# Patient Record
Sex: FEMALE | Race: WHITE | Employment: FULL TIME | URBAN - METROPOLITAN AREA
[De-identification: names, ages, dates, MRNs, and addresses within clinical notes are randomized per-mention and may not be internally consistent; named-entity substitution may affect disease eponyms.]

---

## 2018-01-24 ENCOUNTER — APPOINTMENT (OUTPATIENT)
Dept: CT IMAGING | Facility: HOSPITAL | Age: 48
DRG: 175 | End: 2018-01-24
Attending: INTERNAL MEDICINE
Payer: COMMERCIAL

## 2018-01-24 ENCOUNTER — HOSPITAL ENCOUNTER (INPATIENT)
Facility: HOSPITAL | Age: 48
LOS: 6 days | Discharge: HOME | DRG: 175 | End: 2018-01-30
Attending: INTERNAL MEDICINE | Admitting: INTERNAL MEDICINE
Payer: COMMERCIAL

## 2018-01-24 ENCOUNTER — HOSPITAL ENCOUNTER (EMERGENCY)
Facility: HOSPITAL | Age: 48
Discharge: HOME | DRG: 175 | End: 2018-01-24
Payer: COMMERCIAL

## 2018-01-24 VITALS
OXYGEN SATURATION: 85 % | BODY MASS INDEX: 39.27 KG/M2 | WEIGHT: 200 LBS | HEART RATE: 86 BPM | HEIGHT: 60 IN | RESPIRATION RATE: 16 BRPM | TEMPERATURE: 99 F | DIASTOLIC BLOOD PRESSURE: 66 MMHG | SYSTOLIC BLOOD PRESSURE: 93 MMHG

## 2018-01-24 LAB
ALBUMIN SERPL-MCNC: 2.6 G/DL (ref 3.5–4.8)
ALP LIVER SERPL-CCNC: 105 U/L (ref 39–100)
ALT SERPL-CCNC: 82 U/L (ref 14–54)
AST SERPL-CCNC: 47 U/L (ref 15–41)
BASOPHILS # BLD AUTO: 0.02 X10(3) UL (ref 0–0.1)
BASOPHILS NFR BLD AUTO: 0.4 %
BILIRUB SERPL-MCNC: 0.4 MG/DL (ref 0.1–2)
BUN BLD-MCNC: 8 MG/DL (ref 8–20)
CALCIUM BLD-MCNC: 8.1 MG/DL (ref 8.3–10.3)
CHLORIDE: 105 MMOL/L (ref 101–111)
CO2: 24 MMOL/L (ref 22–32)
CREAT BLD-MCNC: 0.59 MG/DL (ref 0.55–1.02)
D-DIMER: 2.91 UG/ML FEU (ref 0–0.49)
EOSINOPHIL # BLD AUTO: 0.04 X10(3) UL (ref 0–0.3)
EOSINOPHIL NFR BLD AUTO: 0.9 %
ERYTHROCYTE [DISTWIDTH] IN BLOOD BY AUTOMATED COUNT: 13.2 % (ref 11.5–16)
GLUCOSE BLD-MCNC: 85 MG/DL (ref 70–99)
HAV IGM SER QL: 2.1 MG/DL (ref 1.7–3)
HCT VFR BLD AUTO: 37.6 % (ref 34–50)
HGB BLD-MCNC: 12.5 G/DL (ref 12–16)
IMMATURE GRANULOCYTE COUNT: 0.09 X10(3) UL (ref 0–1)
IMMATURE GRANULOCYTE RATIO %: 2 %
LYMPHOCYTES # BLD AUTO: 1.12 X10(3) UL (ref 0.9–4)
LYMPHOCYTES NFR BLD AUTO: 24.3 %
M PROTEIN MFR SERPL ELPH: 7.4 G/DL (ref 6.1–8.3)
MCH RBC QN AUTO: 28.2 PG (ref 27–33.2)
MCHC RBC AUTO-ENTMCNC: 33.2 G/DL (ref 31–37)
MCV RBC AUTO: 84.9 FL (ref 81–100)
MONOCYTES # BLD AUTO: 0.59 X10(3) UL (ref 0.1–0.6)
MONOCYTES NFR BLD AUTO: 12.8 %
NEUTROPHIL ABS PRELIM: 2.75 X10 (3) UL (ref 1.3–6.7)
NEUTROPHILS # BLD AUTO: 2.75 X10(3) UL (ref 1.3–6.7)
NEUTROPHILS NFR BLD AUTO: 59.6 %
PLATELET # BLD AUTO: 308 10(3)UL (ref 150–450)
POTASSIUM SERPL-SCNC: 3.8 MMOL/L (ref 3.6–5.1)
PROCALCITONIN SERPL-MCNC: <0.11 NG/ML (ref ?–0.11)
RBC # BLD AUTO: 4.43 X10(6)UL (ref 3.8–5.1)
RED CELL DISTRIBUTION WIDTH-SD: 41.2 FL (ref 35.1–46.3)
SODIUM SERPL-SCNC: 139 MMOL/L (ref 136–144)
WBC # BLD AUTO: 4.6 X10(3) UL (ref 4–13)

## 2018-01-24 PROCEDURE — 87081 CULTURE SCREEN ONLY: CPT | Performed by: INTERNAL MEDICINE

## 2018-01-24 PROCEDURE — 83735 ASSAY OF MAGNESIUM: CPT | Performed by: INTERNAL MEDICINE

## 2018-01-24 PROCEDURE — 80053 COMPREHEN METABOLIC PANEL: CPT | Performed by: INTERNAL MEDICINE

## 2018-01-24 PROCEDURE — 85378 FIBRIN DEGRADE SEMIQUANT: CPT | Performed by: INTERNAL MEDICINE

## 2018-01-24 PROCEDURE — 71275 CT ANGIOGRAPHY CHEST: CPT | Performed by: INTERNAL MEDICINE

## 2018-01-24 PROCEDURE — 85025 COMPLETE CBC W/AUTO DIFF WBC: CPT | Performed by: INTERNAL MEDICINE

## 2018-01-24 PROCEDURE — 84145 PROCALCITONIN (PCT): CPT | Performed by: INTERNAL MEDICINE

## 2018-01-24 RX ORDER — GUAIFENESIN AND DEXTROMETHORPHAN HYDROBROMIDE 1200; 60 MG/1; MG/1
1 TABLET, EXTENDED RELEASE ORAL EVERY 12 HOURS
Status: DISCONTINUED | OUTPATIENT
Start: 2018-01-24 | End: 2018-01-30

## 2018-01-24 RX ORDER — GUAIFENESIN AND DEXTROMETHORPHAN HYDROBROMIDE 1200; 60 MG/1; MG/1
1 TABLET, EXTENDED RELEASE ORAL EVERY 12 HOURS
Status: DISCONTINUED | OUTPATIENT
Start: 2018-01-24 | End: 2018-01-24 | Stop reason: RX

## 2018-01-24 RX ORDER — LEVOFLOXACIN 750 MG/1
750 TABLET ORAL DAILY
COMMUNITY
End: 2018-01-30

## 2018-01-24 RX ORDER — OSELTAMIVIR PHOSPHATE 75 MG/1
75 CAPSULE ORAL 2 TIMES DAILY
Status: COMPLETED | OUTPATIENT
Start: 2018-01-24 | End: 2018-01-26

## 2018-01-24 RX ORDER — SODIUM CHLORIDE 9 MG/ML
INJECTION, SOLUTION INTRAVENOUS CONTINUOUS
Status: DISCONTINUED | OUTPATIENT
Start: 2018-01-24 | End: 2018-01-28

## 2018-01-24 RX ORDER — GUAIFENESIN 600 MG
1200 TABLET, EXTENDED RELEASE 12 HR ORAL EVERY 12 HOURS
Status: DISCONTINUED | OUTPATIENT
Start: 2018-01-24 | End: 2018-01-24

## 2018-01-24 RX ORDER — SACCHAROMYCES BOULARDII 250 MG
250 CAPSULE ORAL 2 TIMES DAILY
COMMUNITY
End: 2018-01-30

## 2018-01-24 RX ORDER — ENOXAPARIN SODIUM 100 MG/ML
40 INJECTION SUBCUTANEOUS DAILY
Status: DISCONTINUED | OUTPATIENT
Start: 2018-01-24 | End: 2018-01-24

## 2018-01-24 RX ORDER — OSELTAMIVIR PHOSPHATE 30 MG/1
30 CAPSULE ORAL 2 TIMES DAILY
COMMUNITY
End: 2018-01-30

## 2018-01-24 RX ORDER — LEVOFLOXACIN 500 MG/1
750 TABLET, FILM COATED ORAL DAILY
Status: ON HOLD | COMMUNITY
End: 2018-01-24

## 2018-01-24 RX ORDER — GUAIFENESIN AND DEXTROMETHORPHAN HYDROBROMIDE 1200; 60 MG/1; MG/1
1 TABLET, EXTENDED RELEASE ORAL EVERY 12 HOURS
COMMUNITY

## 2018-01-25 ENCOUNTER — APPOINTMENT (OUTPATIENT)
Dept: ULTRASOUND IMAGING | Facility: HOSPITAL | Age: 48
DRG: 175 | End: 2018-01-25
Attending: INTERNAL MEDICINE
Payer: COMMERCIAL

## 2018-01-25 ENCOUNTER — APPOINTMENT (OUTPATIENT)
Dept: CV DIAGNOSTICS | Facility: HOSPITAL | Age: 48
DRG: 175 | End: 2018-01-25
Attending: INTERNAL MEDICINE
Payer: COMMERCIAL

## 2018-01-25 LAB
ADENOVIRUS PCR:: NEGATIVE
ALBUMIN SERPL-MCNC: 2.3 G/DL (ref 3.5–4.8)
ALP LIVER SERPL-CCNC: 101 U/L (ref 39–100)
ALT SERPL-CCNC: 61 U/L (ref 14–54)
APTT PPP: 126 SECONDS (ref 25–34)
APTT PPP: 31.4 SECONDS (ref 25–34)
AST SERPL-CCNC: 33 U/L (ref 15–41)
B PERT DNA SPEC QL NAA+PROBE: NEGATIVE
BILIRUB SERPL-MCNC: 0.4 MG/DL (ref 0.1–2)
BILIRUB UR QL STRIP.AUTO: NEGATIVE
BUN BLD-MCNC: 8 MG/DL (ref 8–20)
BUN BLD-MCNC: 8 MG/DL (ref 8–20)
C PNEUM DNA SPEC QL NAA+PROBE: NEGATIVE
CALCIUM BLD-MCNC: 7.7 MG/DL (ref 8.3–10.3)
CALCIUM BLD-MCNC: 7.8 MG/DL (ref 8.3–10.3)
CHLORIDE: 107 MMOL/L (ref 101–111)
CHLORIDE: 107 MMOL/L (ref 101–111)
CLARITY UR REFRACT.AUTO: CLEAR
CO2: 25 MMOL/L (ref 22–32)
CO2: 25 MMOL/L (ref 22–32)
CORONAVIRUS 229E PCR:: NEGATIVE
CORONAVIRUS HKU1 PCR:: NEGATIVE
CORONAVIRUS NL63 PCR:: NEGATIVE
CORONAVIRUS OC43 PCR:: NEGATIVE
CREAT BLD-MCNC: 0.48 MG/DL (ref 0.55–1.02)
CREAT BLD-MCNC: 0.52 MG/DL (ref 0.55–1.02)
ERYTHROCYTE [DISTWIDTH] IN BLOOD BY AUTOMATED COUNT: 13.3 % (ref 11.5–16)
FLUAV H1 2009 PAND RNA SPEC QL NAA+PROBE: POSITIVE
FLUBV RNA SPEC QL NAA+PROBE: NEGATIVE
GLUCOSE BLD-MCNC: 90 MG/DL (ref 70–99)
GLUCOSE BLD-MCNC: 93 MG/DL (ref 70–99)
GLUCOSE UR STRIP.AUTO-MCNC: NEGATIVE MG/DL
HCT VFR BLD AUTO: 39.2 % (ref 34–50)
HGB BLD-MCNC: 12.9 G/DL (ref 12–16)
HOMOCYSTEINE: 6.9 UMOL/L (ref 5–13.9)
KETONES UR STRIP.AUTO-MCNC: NEGATIVE MG/DL
LEUKOCYTE ESTERASE UR QL STRIP.AUTO: NEGATIVE
M PROTEIN MFR SERPL ELPH: 7 G/DL (ref 6.1–8.3)
MCH RBC QN AUTO: 28.2 PG (ref 27–33.2)
MCHC RBC AUTO-ENTMCNC: 32.9 G/DL (ref 31–37)
MCV RBC AUTO: 85.6 FL (ref 81–100)
METAPNEUMOVIRUS PCR:: NEGATIVE
MYCOPLASMA PNEUMONIA PCR:: NEGATIVE
NITRITE UR QL STRIP.AUTO: NEGATIVE
PARAINFLUENZA 1 PCR:: NEGATIVE
PARAINFLUENZA 2 PCR:: NEGATIVE
PARAINFLUENZA 3 PCR:: NEGATIVE
PARAINFLUENZA 4 PCR:: NEGATIVE
PH UR STRIP.AUTO: 7 [PH] (ref 4.5–8)
PLATELET # BLD AUTO: 354 10(3)UL (ref 150–450)
POTASSIUM SERPL-SCNC: 3.8 MMOL/L (ref 3.6–5.1)
POTASSIUM SERPL-SCNC: 3.8 MMOL/L (ref 3.6–5.1)
PROT UR STRIP.AUTO-MCNC: NEGATIVE MG/DL
RBC # BLD AUTO: 4.58 X10(6)UL (ref 3.8–5.1)
RBC UR QL AUTO: NEGATIVE
RED CELL DISTRIBUTION WIDTH-SD: 41.8 FL (ref 35.1–46.3)
RHINOVIRUS/ENTERO PCR:: NEGATIVE
RSV RNA SPEC QL NAA+PROBE: NEGATIVE
SODIUM SERPL-SCNC: 137 MMOL/L (ref 136–144)
SODIUM SERPL-SCNC: 138 MMOL/L (ref 136–144)
SP GR UR STRIP.AUTO: 1.01 (ref 1–1.03)
UROBILINOGEN UR STRIP.AUTO-MCNC: <2 MG/DL
WBC # BLD AUTO: 5.1 X10(3) UL (ref 4–13)

## 2018-01-25 PROCEDURE — 93306 TTE W/DOPPLER COMPLETE: CPT | Performed by: INTERNAL MEDICINE

## 2018-01-25 PROCEDURE — 81241 F5 GENE: CPT | Performed by: INTERNAL MEDICINE

## 2018-01-25 PROCEDURE — 87581 M.PNEUMON DNA AMP PROBE: CPT | Performed by: INTERNAL MEDICINE

## 2018-01-25 PROCEDURE — 93970 EXTREMITY STUDY: CPT | Performed by: INTERNAL MEDICINE

## 2018-01-25 PROCEDURE — 85306 CLOT INHIBIT PROT S FREE: CPT | Performed by: INTERNAL MEDICINE

## 2018-01-25 PROCEDURE — 87999 UNLISTED MICROBIOLOGY PX: CPT

## 2018-01-25 PROCEDURE — 87205 SMEAR GRAM STAIN: CPT | Performed by: INTERNAL MEDICINE

## 2018-01-25 PROCEDURE — 87633 RESP VIRUS 12-25 TARGETS: CPT | Performed by: INTERNAL MEDICINE

## 2018-01-25 PROCEDURE — 85705 THROMBOPLASTIN INHIBITION: CPT | Performed by: INTERNAL MEDICINE

## 2018-01-25 PROCEDURE — 87486 CHLMYD PNEUM DNA AMP PROBE: CPT | Performed by: INTERNAL MEDICINE

## 2018-01-25 PROCEDURE — 81003 URINALYSIS AUTO W/O SCOPE: CPT | Performed by: INTERNAL MEDICINE

## 2018-01-25 PROCEDURE — 85613 RUSSELL VIPER VENOM DILUTED: CPT | Performed by: INTERNAL MEDICINE

## 2018-01-25 PROCEDURE — 87798 DETECT AGENT NOS DNA AMP: CPT | Performed by: INTERNAL MEDICINE

## 2018-01-25 PROCEDURE — 80053 COMPREHEN METABOLIC PANEL: CPT | Performed by: INTERNAL MEDICINE

## 2018-01-25 PROCEDURE — 81291 MTHFR GENE: CPT | Performed by: INTERNAL MEDICINE

## 2018-01-25 PROCEDURE — 85027 COMPLETE CBC AUTOMATED: CPT | Performed by: INTERNAL MEDICINE

## 2018-01-25 PROCEDURE — 87070 CULTURE OTHR SPECIMN AEROBIC: CPT | Performed by: INTERNAL MEDICINE

## 2018-01-25 PROCEDURE — 85732 THROMBOPLASTIN TIME PARTIAL: CPT | Performed by: INTERNAL MEDICINE

## 2018-01-25 PROCEDURE — 85730 THROMBOPLASTIN TIME PARTIAL: CPT | Performed by: INTERNAL MEDICINE

## 2018-01-25 PROCEDURE — 83090 ASSAY OF HOMOCYSTEINE: CPT | Performed by: INTERNAL MEDICINE

## 2018-01-25 PROCEDURE — 87449 NOS EACH ORGANISM AG IA: CPT | Performed by: INTERNAL MEDICINE

## 2018-01-25 PROCEDURE — 85300 ANTITHROMBIN III ACTIVITY: CPT | Performed by: INTERNAL MEDICINE

## 2018-01-25 PROCEDURE — 86147 CARDIOLIPIN ANTIBODY EA IG: CPT | Performed by: INTERNAL MEDICINE

## 2018-01-25 PROCEDURE — 85303 CLOT INHIBIT PROT C ACTIVITY: CPT | Performed by: INTERNAL MEDICINE

## 2018-01-25 PROCEDURE — 85610 PROTHROMBIN TIME: CPT | Performed by: INTERNAL MEDICINE

## 2018-01-25 RX ORDER — LEVOFLOXACIN 5 MG/ML
750 INJECTION, SOLUTION INTRAVENOUS EVERY 24 HOURS
Status: DISCONTINUED | OUTPATIENT
Start: 2018-01-25 | End: 2018-01-28

## 2018-01-25 RX ORDER — HEPARIN SODIUM AND DEXTROSE 10000; 5 [USP'U]/100ML; G/100ML
18 INJECTION INTRAVENOUS ONCE
Status: COMPLETED | OUTPATIENT
Start: 2018-01-25 | End: 2018-01-25

## 2018-01-25 RX ORDER — IPRATROPIUM BROMIDE AND ALBUTEROL SULFATE 2.5; .5 MG/3ML; MG/3ML
3 SOLUTION RESPIRATORY (INHALATION) EVERY 6 HOURS PRN
Status: DISCONTINUED | OUTPATIENT
Start: 2018-01-25 | End: 2018-01-26

## 2018-01-25 RX ORDER — HEPARIN SODIUM AND DEXTROSE 10000; 5 [USP'U]/100ML; G/100ML
INJECTION INTRAVENOUS CONTINUOUS
Status: DISPENSED | OUTPATIENT
Start: 2018-01-25 | End: 2018-01-27

## 2018-01-25 RX ORDER — HEPARIN SODIUM 5000 [USP'U]/ML
80 INJECTION INTRAVENOUS; SUBCUTANEOUS ONCE
Status: COMPLETED | OUTPATIENT
Start: 2018-01-25 | End: 2018-01-25

## 2018-01-25 NOTE — PLAN OF CARE
GASTROINTESTINAL - ADULT    • Minimal or absence of nausea and vomiting Progressing    • Maintains or returns to baseline bowel function Progressing        HEMATOLOGIC - ADULT    • Free from bleeding injury Progressing        Patient/Family Goals    • Theresa

## 2018-01-25 NOTE — H&P
[facility]    PATIENT'S NAME: Oneida Colorado   ATTENDING PHYSICIAN: Johnna Desai M.D.    PATIENT ACCOUNT#:   [de-identified]    LOCATION:  [address] [place] [address]  MEDICAL RECORD #:   [id number]       YOB: 1970  ADMISSION DATE:       01/24/2018 Blood pressure at this time is 113/70, pulse 80, respirations 16, temperature 98.3, saturation 93% on room air. HEENT:  Slight pallor. Pupils regular. Oral cavity is moist.  NECK:  Supple. No JVP. LUNGS:  Bilaterally symmetrical.  Trachea midline.   [place] Pals all other ROS negative except as per HPI

## 2018-01-25 NOTE — PROGRESS NOTES
CTA chest positive for PE and pneumonia, notified Dr. Douglas Ruffin, see orders. Consult pulmonary, Dr. Yi Ortiz informed. Ultrasound BLE ordered.

## 2018-01-25 NOTE — PLAN OF CARE
NURSING ADMISSION NOTE      Patient admitted via Cart  Oriented to room. Safety precautions initiated. Bed in low position. Call light in reach.   Transferred from overflow,awake,alert  deneis any chest pain, no sob  4l O2 sats 93%  Bedrest for now,

## 2018-01-26 ENCOUNTER — APPOINTMENT (OUTPATIENT)
Dept: GENERAL RADIOLOGY | Facility: HOSPITAL | Age: 48
DRG: 175 | End: 2018-01-26
Attending: INTERNAL MEDICINE
Payer: COMMERCIAL

## 2018-01-26 LAB
ALBUMIN SERPL-MCNC: 2.5 G/DL (ref 3.5–4.8)
ALP LIVER SERPL-CCNC: 101 U/L (ref 39–100)
ALT SERPL-CCNC: 58 U/L (ref 14–54)
APTT PPP: 131.5 SECONDS (ref 25–34)
APTT PPP: 96.4 SECONDS (ref 25–34)
AST SERPL-CCNC: 32 U/L (ref 15–41)
AT3 ACTIVITY: 107 % (ref 80–120)
BASOPHILS # BLD AUTO: 0.03 X10(3) UL (ref 0–0.1)
BASOPHILS NFR BLD AUTO: 0.5 %
BILIRUB SERPL-MCNC: 0.4 MG/DL (ref 0.1–2)
BUN BLD-MCNC: 9 MG/DL (ref 8–20)
CALCIUM BLD-MCNC: 8.2 MG/DL (ref 8.3–10.3)
CHLORIDE: 106 MMOL/L (ref 101–111)
CO2: 22 MMOL/L (ref 22–32)
CREAT BLD-MCNC: 0.51 MG/DL (ref 0.55–1.02)
DRVVT LUPUS ANTICOAGULANT: NEGATIVE
DRVVT SCREEN RATIO: 0.88 (ref 0–1.29)
EOSINOPHIL # BLD AUTO: 0.04 X10(3) UL (ref 0–0.3)
EOSINOPHIL NFR BLD AUTO: 0.7 %
ERYTHROCYTE [DISTWIDTH] IN BLOOD BY AUTOMATED COUNT: 13.2 % (ref 11.5–16)
GLUCOSE BLD-MCNC: 98 MG/DL (ref 70–99)
HCT VFR BLD AUTO: 37 % (ref 34–50)
HGB BLD-MCNC: 12.3 G/DL (ref 12–16)
IMMATURE GRANULOCYTE COUNT: 0.09 X10(3) UL (ref 0–1)
IMMATURE GRANULOCYTE RATIO %: 1.6 %
LEGIONELLA PNEUMOPHILA AG, URI: NEGATIVE
LYMPHOCYTES # BLD AUTO: 1.35 X10(3) UL (ref 0.9–4)
LYMPHOCYTES NFR BLD AUTO: 24.5 %
M PROTEIN MFR SERPL ELPH: 7.3 G/DL (ref 6.1–8.3)
MCH RBC QN AUTO: 28.1 PG (ref 27–33.2)
MCHC RBC AUTO-ENTMCNC: 33.2 G/DL (ref 31–37)
MCV RBC AUTO: 84.7 FL (ref 81–100)
MONOCYTES # BLD AUTO: 0.61 X10(3) UL (ref 0.1–0.6)
MONOCYTES NFR BLD AUTO: 11.1 %
NEUTROPHIL ABS PRELIM: 3.38 X10 (3) UL (ref 1.3–6.7)
NEUTROPHILS # BLD AUTO: 3.38 X10(3) UL (ref 1.3–6.7)
NEUTROPHILS NFR BLD AUTO: 61.6 %
PHOSPHOLIPID AB, IGG: NEGATIVE
PHOSPHOLIPID AB, IGM: NEGATIVE
PLATELET # BLD AUTO: 364 10(3)UL (ref 150–450)
POTASSIUM SERPL-SCNC: 3.5 MMOL/L (ref 3.6–5.1)
POTASSIUM SERPL-SCNC: 3.6 MMOL/L (ref 3.6–5.1)
PROTEIN C ACTIVITY: 109 % (ref 70–130)
PROTEIN S ACTIVITY: 109 % (ref 65–140)
RBC # BLD AUTO: 4.37 X10(6)UL (ref 3.8–5.1)
RED CELL DISTRIBUTION WIDTH-SD: 40.7 FL (ref 35.1–46.3)
SODIUM SERPL-SCNC: 137 MMOL/L (ref 136–144)
STACLOT LA DELTA: 0 SECONDS (ref ?–8)
STACLOT LA: NEGATIVE
WBC # BLD AUTO: 5.5 X10(3) UL (ref 4–13)

## 2018-01-26 PROCEDURE — 94640 AIRWAY INHALATION TREATMENT: CPT

## 2018-01-26 PROCEDURE — 85025 COMPLETE CBC W/AUTO DIFF WBC: CPT | Performed by: INTERNAL MEDICINE

## 2018-01-26 PROCEDURE — 80053 COMPREHEN METABOLIC PANEL: CPT | Performed by: INTERNAL MEDICINE

## 2018-01-26 PROCEDURE — 84132 ASSAY OF SERUM POTASSIUM: CPT | Performed by: INTERNAL MEDICINE

## 2018-01-26 PROCEDURE — 85730 THROMBOPLASTIN TIME PARTIAL: CPT | Performed by: INTERNAL MEDICINE

## 2018-01-26 PROCEDURE — 71045 X-RAY EXAM CHEST 1 VIEW: CPT | Performed by: INTERNAL MEDICINE

## 2018-01-26 RX ORDER — IPRATROPIUM BROMIDE AND ALBUTEROL SULFATE 2.5; .5 MG/3ML; MG/3ML
3 SOLUTION RESPIRATORY (INHALATION) EVERY 6 HOURS PRN
Status: DISCONTINUED | OUTPATIENT
Start: 2018-01-26 | End: 2018-01-30

## 2018-01-26 RX ORDER — IPRATROPIUM BROMIDE AND ALBUTEROL SULFATE 2.5; .5 MG/3ML; MG/3ML
3 SOLUTION RESPIRATORY (INHALATION)
Status: DISCONTINUED | OUTPATIENT
Start: 2018-01-26 | End: 2018-01-26

## 2018-01-26 RX ORDER — IPRATROPIUM BROMIDE AND ALBUTEROL SULFATE 2.5; .5 MG/3ML; MG/3ML
3 SOLUTION RESPIRATORY (INHALATION)
Status: DISCONTINUED | OUTPATIENT
Start: 2018-01-26 | End: 2018-01-29

## 2018-01-26 NOTE — PROGRESS NOTES
Requiring more 02 today per patient exertional dyspnea no improvement  Seen by dr. Ne lawrence to tid and hs  From prn   Incentive spirometry RT  To reach;

## 2018-01-26 NOTE — PROGRESS NOTES
[facility] Pulmonary/Critical Care Progress Note     SUBJECTIVE/24H Events: All events, procedures, notes reviewed. No acute events, she continues to have blood mixed with mucus, no bright red blood.  Contniues to c/o co GLU  90  93  98   BUN  8  8  9   CREATSERUM  0.52*  0.48*  0.51*   CA  7.7*  7.8*  8.2*   NA  138  137  137   K  3.8  3.8  3.5*   CL  107  107  106   CO2  25.0  25.0  22.0     Recent Labs   Lab  01/24/18   1249  01/25/18   0940  01/26/18   0549   RBC  4. Diag Img (rnz=[id number])    Result Date: 1/25/2018  CONCLUSION:  Unremarkable exam, no evidence of lower extremity DVT. Dictated by: Brendon Mora DO on 1/25/2018 at 15:25     Approved by:  Brendon Mora DO            Xr Chest Ap Portable  (cpt=71045)    Res

## 2018-01-26 NOTE — PLAN OF CARE
A&O on 4L NC and SR on tele. Pt reports feeling increasingly SOB this a.m. And requested to have her O2 bumped up (up to 4 L from 3 L). Pt and family updated on POC. Will monitor.

## 2018-01-27 ENCOUNTER — APPOINTMENT (OUTPATIENT)
Dept: GENERAL RADIOLOGY | Facility: HOSPITAL | Age: 48
DRG: 175 | End: 2018-01-27
Attending: INTERNAL MEDICINE
Payer: COMMERCIAL

## 2018-01-27 LAB
APTT PPP: 66 SECONDS (ref 25–34)
APTT PPP: 84.2 SECONDS (ref 25–34)
CREAT BLD-MCNC: 0.48 MG/DL (ref 0.55–1.02)
PLATELET # BLD AUTO: 365 10(3)UL (ref 150–450)
POTASSIUM SERPL-SCNC: 3.7 MMOL/L (ref 3.6–5.1)

## 2018-01-27 PROCEDURE — 82565 ASSAY OF CREATININE: CPT | Performed by: INTERNAL MEDICINE

## 2018-01-27 PROCEDURE — 71046 X-RAY EXAM CHEST 2 VIEWS: CPT | Performed by: INTERNAL MEDICINE

## 2018-01-27 PROCEDURE — 94640 AIRWAY INHALATION TREATMENT: CPT

## 2018-01-27 PROCEDURE — 85730 THROMBOPLASTIN TIME PARTIAL: CPT | Performed by: INTERNAL MEDICINE

## 2018-01-27 PROCEDURE — 84132 ASSAY OF SERUM POTASSIUM: CPT | Performed by: INTERNAL MEDICINE

## 2018-01-27 PROCEDURE — 94664 DEMO&/EVAL PT USE INHALER: CPT

## 2018-01-27 PROCEDURE — 85049 AUTOMATED PLATELET COUNT: CPT | Performed by: INTERNAL MEDICINE

## 2018-01-27 RX ORDER — POTASSIUM CHLORIDE 20 MEQ/1
40 TABLET, EXTENDED RELEASE ORAL ONCE
Status: COMPLETED | OUTPATIENT
Start: 2018-01-27 | End: 2018-01-27

## 2018-01-27 NOTE — PLAN OF CARE
GASTROINTESTINAL - ADULT    • Minimal or absence of nausea and vomiting Progressing    • Maintains or returns to baseline bowel function Progressing        HEMATOLOGIC - ADULT    • Free from bleeding injury Progressing        RESPIRATORY - ADULT    • Achie

## 2018-01-27 NOTE — PROGRESS NOTES
[facility] Pulmonary/Critical Care Progress Note     SUBJECTIVE/24H Events: All events, procedures, notes reviewed. No acute events overnight, she feels somewhat better today, less cough and dyspnea.  Remains on nasal ca 8.2*   --    --    NA  138  137  137   --    --    K  3.8  3.8  3.5*  3.6  3.7   CL  107  107  106   --    --    CO2  25.0  25.0  22.0   --    --      Recent [employer]   Lab  01/25/18   0940  01/26/18   0549  01/27/18   0614   RBC  4.58  4.37   --    HGB  12.9 1/24/2018 at 1741 hr with appropriate  read back. Consolidation and more patchy infiltrates throughout both lungs most severe involving the left lower lobe. Findings likely represent pneumonia although pulmonary infarction cannot be excluded.   Mediastina cough, IS and mobilize/out of bed  Will continue abx, check procalcitonin tomorrow, if downtrending, will de-escalate vs d/c abx    Marco Abernathy MD  [facility]  01/27/18

## 2018-01-27 NOTE — PROGRESS NOTES
[facility]    Progress Note    Minerva Mclean Patient Status:  Inpatient    1970 MRN [id number]   [facility] 2NE-A Attending Ron Hanson MD   Hosp Day # 3 PCP Tushar Miller MD     Subjective:     Constitutional: Positive for BUN 9 01/26/2018    01/26/2018   K 3.6 01/26/2018    01/26/2018   CO2 22.0 01/26/2018   GLU 98 01/26/2018   CA 8.2 (L) 01/26/2018   ALB 2.5 (L) 01/26/2018   ALKPHO 101 (H) 01/26/2018   BILT 0.4 01/26/2018   TP 7.3 01/26/2018   AST 32 01/26/20

## 2018-01-27 NOTE — PROGRESS NOTES
[facility]    Progress Note    Alfredo Poplar Patient Status:  Inpatient    1970 MRN [id number]   [facility] 2NE-A Attending Konstantin Gracia MD   Hosp Day # 3 PCP Sol Carney MD     Subjective:     Constitutional: Positive for HGB 12.3 01/26/2018   HCT 37.0 01/26/2018   .0 01/27/2018   CREATSERUM 0.48 (L) 01/27/2018   BUN 9 01/26/2018    01/26/2018   K 3.6 01/26/2018    01/26/2018   CO2 22.0 01/26/2018   GLU 98 01/26/2018   CA 8.2 (L) 01/26/2018   ALB 2.5 (L MD  1/27/2018

## 2018-01-27 NOTE — PLAN OF CARE
Problem: GASTROINTESTINAL - ADULT  Goal: Minimal or absence of nausea and vomiting  INTERVENTIONS:  - Maintain adequate hydration with IV or PO as ordered and tolerated  - Nasogastric tube to low intermittent suction as ordered  - Evaluate effectiveness of adequate hemostasis  - Assess for signs and symptoms of internal bleeding  - Monitor lab trends   Outcome: Progressing      Comments: Received bedside report on this Pt. At 1540. Pt.  Awake, A&Ox4, calm and cooperative, speaks some [place], her daughter ro

## 2018-01-28 ENCOUNTER — APPOINTMENT (OUTPATIENT)
Dept: GENERAL RADIOLOGY | Facility: HOSPITAL | Age: 48
DRG: 175 | End: 2018-01-28
Attending: INTERNAL MEDICINE
Payer: COMMERCIAL

## 2018-01-28 LAB
ALBUMIN SERPL-MCNC: 3 G/DL (ref 3.5–4.8)
ALP LIVER SERPL-CCNC: 102 U/L (ref 39–100)
ALT SERPL-CCNC: 65 U/L (ref 14–54)
AST SERPL-CCNC: 39 U/L (ref 15–41)
BILIRUB SERPL-MCNC: 0.4 MG/DL (ref 0.1–2)
BUN BLD-MCNC: 12 MG/DL (ref 8–20)
CALCIUM BLD-MCNC: 8.6 MG/DL (ref 8.3–10.3)
CHLORIDE: 108 MMOL/L (ref 101–111)
CO2: 23 MMOL/L (ref 22–32)
CREAT BLD-MCNC: 0.69 MG/DL (ref 0.55–1.02)
CREAT BLD-MCNC: 0.69 MG/DL (ref 0.55–1.02)
ERYTHROCYTE [DISTWIDTH] IN BLOOD BY AUTOMATED COUNT: 13.2 % (ref 11.5–16)
GLUCOSE BLD-MCNC: 103 MG/DL (ref 70–99)
HCT VFR BLD AUTO: 37.6 % (ref 34–50)
HGB BLD-MCNC: 12.2 G/DL (ref 12–16)
M PROTEIN MFR SERPL ELPH: 7.8 G/DL (ref 6.1–8.3)
MCH RBC QN AUTO: 27.8 PG (ref 27–33.2)
MCHC RBC AUTO-ENTMCNC: 32.4 G/DL (ref 31–37)
MCV RBC AUTO: 85.6 FL (ref 81–100)
PLATELET # BLD AUTO: 376 10(3)UL (ref 150–450)
POTASSIUM SERPL-SCNC: 4.2 MMOL/L (ref 3.6–5.1)
POTASSIUM SERPL-SCNC: 4.2 MMOL/L (ref 3.6–5.1)
PROCALCITONIN SERPL-MCNC: <0.11 NG/ML (ref ?–0.11)
RBC # BLD AUTO: 4.39 X10(6)UL (ref 3.8–5.1)
RED CELL DISTRIBUTION WIDTH-SD: 41.7 FL (ref 35.1–46.3)
SODIUM SERPL-SCNC: 138 MMOL/L (ref 136–144)
WBC # BLD AUTO: 5.7 X10(3) UL (ref 4–13)

## 2018-01-28 PROCEDURE — 82565 ASSAY OF CREATININE: CPT | Performed by: INTERNAL MEDICINE

## 2018-01-28 PROCEDURE — 94640 AIRWAY INHALATION TREATMENT: CPT

## 2018-01-28 PROCEDURE — 85027 COMPLETE CBC AUTOMATED: CPT | Performed by: INTERNAL MEDICINE

## 2018-01-28 PROCEDURE — 80053 COMPREHEN METABOLIC PANEL: CPT | Performed by: INTERNAL MEDICINE

## 2018-01-28 PROCEDURE — 71046 X-RAY EXAM CHEST 2 VIEWS: CPT | Performed by: INTERNAL MEDICINE

## 2018-01-28 PROCEDURE — 85049 AUTOMATED PLATELET COUNT: CPT | Performed by: INTERNAL MEDICINE

## 2018-01-28 PROCEDURE — 84145 PROCALCITONIN (PCT): CPT | Performed by: INTERNAL MEDICINE

## 2018-01-28 PROCEDURE — 84132 ASSAY OF SERUM POTASSIUM: CPT | Performed by: INTERNAL MEDICINE

## 2018-01-28 RX ORDER — GARLIC EXTRACT 500 MG
1 CAPSULE ORAL DAILY
Status: DISCONTINUED | OUTPATIENT
Start: 2018-01-28 | End: 2018-01-28

## 2018-01-28 RX ORDER — GARLIC EXTRACT 500 MG
1 CAPSULE ORAL DAILY
Status: DISCONTINUED | OUTPATIENT
Start: 2018-01-28 | End: 2018-01-30

## 2018-01-28 RX ORDER — LEVOFLOXACIN 750 MG/1
750 TABLET ORAL DAILY
Status: DISCONTINUED | OUTPATIENT
Start: 2018-01-29 | End: 2018-01-30

## 2018-01-28 NOTE — PROGRESS NOTES
[facility] Pulmonary/Critical Care Progress Note     SUBJECTIVE/24H Events: All events, procedures, notes reviewed. No acute events overnight, she feels better today. Has been ambulating in the room, less dyspnea.   Darlyn --   8.6   NA  137   --    --   138   K  3.5*  3.6  3.7  4.2  4.2   CL  106   --    --   108   CO2  22.0   --    --   23.0     Gist   Lab  01/26/18   0549  01/27/18   0614  01/28/18   0518   RBC  4.37   --   4.39   HGB  12.3   --   12.2   HCT  37. 0 significant change when compared to  image from CTA of the chest performed 1/24/2018.     Dictated by: Larry Irby MD on 1/26/2018 at 8:12     Approved by: Larry Irby MD              • Acidophilus/Pectin  1 capsule Oral Daily   • piperacillin-tazobactam

## 2018-01-29 ENCOUNTER — APPOINTMENT (OUTPATIENT)
Dept: GENERAL RADIOLOGY | Facility: HOSPITAL | Age: 48
DRG: 175 | End: 2018-01-29
Attending: INTERNAL MEDICINE
Payer: COMMERCIAL

## 2018-01-29 LAB — CREAT BLD-MCNC: 0.54 MG/DL (ref 0.55–1.02)

## 2018-01-29 PROCEDURE — 94640 AIRWAY INHALATION TREATMENT: CPT

## 2018-01-29 PROCEDURE — 94664 DEMO&/EVAL PT USE INHALER: CPT

## 2018-01-29 PROCEDURE — 82565 ASSAY OF CREATININE: CPT | Performed by: INTERNAL MEDICINE

## 2018-01-29 PROCEDURE — 71046 X-RAY EXAM CHEST 2 VIEWS: CPT | Performed by: INTERNAL MEDICINE

## 2018-01-29 RX ORDER — IPRATROPIUM BROMIDE AND ALBUTEROL SULFATE 2.5; .5 MG/3ML; MG/3ML
3 SOLUTION RESPIRATORY (INHALATION) EVERY 4 HOURS PRN
Status: DISCONTINUED | OUTPATIENT
Start: 2018-01-29 | End: 2018-01-29

## 2018-01-29 NOTE — PLAN OF CARE
Diabetes/Glucose Control    • Glucose maintained within prescribed range Progressing        GASTROINTESTINAL - ADULT    • Minimal or absence of nausea and vomiting Progressing    • Maintains or returns to baseline bowel function Progressing        HEMATOLO

## 2018-01-29 NOTE — PROGRESS NOTES
[facility]    Progress Note    Mian Palumbo Patient Status:  Inpatient    1970 MRN [id number]   [facility] 2NE-A Attending Ruth Miller MD   Hosp Day # 4 PCP Christi Lopez MD     Subjective:     Constitutional: Positive for  01/28/2018   K 4.2 01/28/2018   K 4.2 01/28/2018    01/28/2018   CO2 23.0 01/28/2018    (H) 01/28/2018   CA 8.6 01/28/2018   ALB 3.0 (L) 01/28/2018   ALKPHO 102 (H) 01/28/2018   BILT 0.4 01/28/2018   TP 7.8 01/28/2018   AST 39 01/28/2

## 2018-01-29 NOTE — PROGRESS NOTES
[facility]  Progress Note    Bard Maguire Patient Status:  Inpatient    1970 MRN [id number]   [facility] 2NE-A Attending Christine Hartley MD   Hosp Day # 5 PCP Arabella Jason MD     ASSESSMENT  # Bilateral pulmonary emboli - unc weight 187 lb 9.8 oz (85.1 kg), last menstrual period 01/24/2018, SpO2 92 %.     Intake/Output:    Intake/Output Summary (Last 24 hours) at 01/29/18 1619  Last data filed at 01/29/18 1301   Gross per 24 hour   Intake              620 ml   Output (MYCOSTATIN) suspension 500,000 Units 5 mL Oral QID   DM-Guaifenesin ER (MUCINEX DM MAXIMUM STRENGTH)  MG 12 hr tab 1 tablet - Patient Supplied 1 tablet Oral Q12H       PEAK flow  PF Readings from Last 1 Encounters:  No data found for PF        Mary Grace

## 2018-01-30 VITALS
HEART RATE: 88 BPM | TEMPERATURE: 99 F | BODY MASS INDEX: 36 KG/M2 | RESPIRATION RATE: 18 BRPM | OXYGEN SATURATION: 94 % | SYSTOLIC BLOOD PRESSURE: 112 MMHG | DIASTOLIC BLOOD PRESSURE: 72 MMHG | WEIGHT: 184.31 LBS

## 2018-01-30 RX ORDER — LEVOFLOXACIN 750 MG/1
750 TABLET ORAL DAILY
Qty: 5 TABLET | Refills: 0 | Status: SHIPPED | OUTPATIENT
Start: 2018-01-31 | End: 2018-01-30

## 2018-01-30 RX ORDER — LEVOFLOXACIN 750 MG/1
750 TABLET ORAL DAILY
Qty: 5 TABLET | Refills: 0 | Status: SHIPPED | OUTPATIENT
Start: 2018-01-31

## 2018-01-30 NOTE — PROGRESS NOTES
[facility]    Progress Note    Jaswant Duarte Patient Status:  Inpatient    1970 MRN [id number]   [facility] 2NE-A Attending Kelli Pritchett MD   Hosp Day # 5 PCP Sophia Burton MD     Subjective:     Constitutional: Negative for 01/28/2018   PTT 84.2 (H) 01/27/2018   DDIMER 2.91 (H) 01/24/2018   MG 2.1 01/24/2018       Xr Chest Pa + Lat Chest (cpt=71046)    Result Date: 1/29/2018  CONCLUSION:  Multi focal pneumonia. No significant change.     Dictated by: Anton Devlin MD on 1/29/

## 2018-01-30 NOTE — PROGRESS NOTES
[facility]  Progress Note    Karie Aparicio Patient Status:  Inpatient    1970 MRN [id number]   [facility] 2NE-A Attending Jules Dumont MD   Hosp Day # 6 PCP Liu Reed MD     ASSESSMENT  # Bilateral pulmonary emboli - un hours) at 01/30/18 1318  Last data filed at 01/30/18 0746   Gross per 24 hour   Intake              240 ml   Output              800 ml   Net             -560 ml     Wt Readings from Last 6 Encounters:  01/30/18 : 184 lb 4.9 oz (83.6 kg)  01/24/18 : 200 lb

## 2018-01-30 NOTE — CM/SW NOTE
Order received to check for Xarelto coverage. Prescription sent to [facility] [phone], copay is $0 and will be delivered to patient's room upon d/c.     General Nayeli RN,   Phone [phone]  Pager [phone]

## 2018-01-30 NOTE — PAYOR COMM NOTE
--------------  DISCHARGE REVIEW    Payor: [address]  Subscriber #:  [id number]  Authorization Number: [id number]    Admit date: 1/24/18  Admit time:  Nicole Foster 46  Discharge Date: 1/30/2018  4:02 PM     Admitting Physician: Seth Giles MD  At

## 2018-02-12 NOTE — DISCHARGE SUMMARY
73-year-old female was admitted on January 24, 2018 with a of shortness of breath. The patient was seen on outpatient basis and treated with oral antibiotics.   The condition got worse as is the patient was seen in the emergency room and admitted for pneum

## 2018-05-17 ENCOUNTER — PRIOR ORIGINAL RECORDS (OUTPATIENT)
Dept: OTHER | Age: 48
End: 2018-05-17

## 2018-05-17 ENCOUNTER — LAB ENCOUNTER (OUTPATIENT)
Dept: LAB | Facility: HOSPITAL | Age: 48
End: 2018-05-17
Attending: INTERNAL MEDICINE
Payer: COMMERCIAL

## 2018-05-17 DIAGNOSIS — I26.90 SEPTIC PULMONARY EMBOLISM (HCC): ICD-10-CM

## 2018-05-17 DIAGNOSIS — R77.1 HYPERGLOBULINEMIA: Primary | ICD-10-CM

## 2018-05-17 PROCEDURE — 84165 PROTEIN E-PHORESIS SERUM: CPT

## 2018-05-17 PROCEDURE — 86334 IMMUNOFIX E-PHORESIS SERUM: CPT

## 2018-05-17 PROCEDURE — 85301 ANTITHROMBIN III ANTIGEN: CPT

## 2018-05-17 PROCEDURE — 83883 ASSAY NEPHELOMETRY NOT SPEC: CPT

## 2018-05-17 PROCEDURE — 36415 COLL VENOUS BLD VENIPUNCTURE: CPT

## 2018-05-17 PROCEDURE — 85025 COMPLETE CBC W/AUTO DIFF WBC: CPT

## 2018-08-16 ENCOUNTER — PRIOR ORIGINAL RECORDS (OUTPATIENT)
Dept: OTHER | Age: 48
End: 2018-08-16

## 2018-11-06 ENCOUNTER — ORDER TRANSCRIPTION (OUTPATIENT)
Dept: ADMINISTRATIVE | Facility: HOSPITAL | Age: 48
End: 2018-11-06

## 2018-11-06 DIAGNOSIS — R59.0 THORACIC LYMPHADENOPATHY: Primary | ICD-10-CM

## 2018-11-06 DIAGNOSIS — R91.8 PULMONARY INFILTRATE: ICD-10-CM

## 2020-10-11 VITALS
WEIGHT: 206 LBS | HEIGHT: 60 IN | DIASTOLIC BLOOD PRESSURE: 70 MMHG | BODY MASS INDEX: 40.44 KG/M2 | SYSTOLIC BLOOD PRESSURE: 118 MMHG

## 2020-10-11 VITALS — SYSTOLIC BLOOD PRESSURE: 106 MMHG | DIASTOLIC BLOOD PRESSURE: 60 MMHG | WEIGHT: 206 LBS

## 2023-07-31 ENCOUNTER — HOSPITAL ENCOUNTER (EMERGENCY)
Facility: HOSPITAL | Age: 53
Discharge: HOME | End: 2023-07-31
Attending: EMERGENCY MEDICINE
Payer: OTHER MISCELLANEOUS

## 2023-07-31 ENCOUNTER — APPOINTMENT (OUTPATIENT)
Dept: GENERAL RADIOLOGY | Facility: HOSPITAL | Age: 53
End: 2023-07-31
Attending: EMERGENCY MEDICINE
Payer: OTHER MISCELLANEOUS

## 2023-07-31 VITALS
HEART RATE: 66 BPM | BODY MASS INDEX: 31.41 KG/M2 | RESPIRATION RATE: 19 BRPM | OXYGEN SATURATION: 94 % | WEIGHT: 160 LBS | DIASTOLIC BLOOD PRESSURE: 76 MMHG | HEIGHT: 60 IN | SYSTOLIC BLOOD PRESSURE: 132 MMHG | TEMPERATURE: 98 F

## 2023-07-31 DIAGNOSIS — S62.101A CLOSED FRACTURE OF RIGHT WRIST, INITIAL ENCOUNTER: Primary | ICD-10-CM

## 2023-07-31 PROCEDURE — 96375 TX/PRO/DX INJ NEW DRUG ADDON: CPT

## 2023-07-31 PROCEDURE — 96374 THER/PROPH/DIAG INJ IV PUSH: CPT

## 2023-07-31 PROCEDURE — 73560 X-RAY EXAM OF KNEE 1 OR 2: CPT | Performed by: EMERGENCY MEDICINE

## 2023-07-31 PROCEDURE — 73090 X-RAY EXAM OF FOREARM: CPT | Performed by: EMERGENCY MEDICINE

## 2023-07-31 PROCEDURE — 29125 APPL SHORT ARM SPLINT STATIC: CPT

## 2023-07-31 PROCEDURE — 73110 X-RAY EXAM OF WRIST: CPT | Performed by: EMERGENCY MEDICINE

## 2023-07-31 PROCEDURE — 99285 EMERGENCY DEPT VISIT HI MDM: CPT

## 2023-07-31 RX ORDER — ONDANSETRON 4 MG/1
4 TABLET, ORALLY DISINTEGRATING ORAL EVERY 4 HOURS PRN
Qty: 10 TABLET | Refills: 0 | Status: SHIPPED | OUTPATIENT
Start: 2023-07-31 | End: 2023-08-07

## 2023-07-31 RX ORDER — HYDROCODONE BITARTRATE AND ACETAMINOPHEN 5; 325 MG/1; MG/1
1-2 TABLET ORAL EVERY 6 HOURS PRN
Qty: 10 TABLET | Refills: 0 | Status: SHIPPED | OUTPATIENT
Start: 2023-07-31 | End: 2023-08-05

## 2023-07-31 RX ORDER — HYDROMORPHONE HYDROCHLORIDE 1 MG/ML
0.5 INJECTION, SOLUTION INTRAMUSCULAR; INTRAVENOUS; SUBCUTANEOUS ONCE
Status: COMPLETED | OUTPATIENT
Start: 2023-07-31 | End: 2023-07-31

## 2023-07-31 RX ORDER — ONDANSETRON 2 MG/ML
INJECTION INTRAMUSCULAR; INTRAVENOUS
Status: COMPLETED
Start: 2023-07-31 | End: 2023-07-31

## 2023-07-31 RX ORDER — LOSARTAN POTASSIUM 50 MG/1
50 TABLET ORAL DAILY
COMMUNITY
Start: 2022-09-13

## 2023-07-31 RX ORDER — ONDANSETRON 2 MG/ML
4 INJECTION INTRAMUSCULAR; INTRAVENOUS ONCE
Status: COMPLETED | OUTPATIENT
Start: 2023-07-31 | End: 2023-07-31

## 2023-07-31 NOTE — ED QUICK NOTES
Pt assisted with dressing. Pt with steady gait. MA from [employer] here for urine drug testing. Pt will leave as work comp and is ok with urine drug testing. Gait steady.

## 2023-07-31 NOTE — ED QUICK NOTES
Spoke with employee health charge Kelvin Islas who will send an MA down to complete necessary drug testing per employer.

## 2023-07-31 NOTE — ED QUICK NOTES
Finger traps in place to right hand. Pt tolerating very well with arm suspended. Pt speaking on phone. Pt does not wish to have additional dose of pain medication at this time.

## 2023-07-31 NOTE — ED QUICK NOTES
Per , pt reports pain 2/10 while still but up to 10 if tries to move. CMS remains intact. Pt verbalized she was on xarelto approx 6 years ago after developing PE's when had pneumonia but has not taken in several years. Pt reports only medication is losartan for blood pressure.     Pt reports she drank coffee at 6am.

## 2023-07-31 NOTE — ED QUICK NOTES
Pt and daughter aware of mandatory drug testing but they are unsure if fall will be placed under work comp because she was not clocked in at time of fall.

## 2023-07-31 NOTE — ED QUICK NOTES
Pt seated upright on cart, appears much more comfortable. Short arm splint and arm sling in place. Pt states pain is much improved.

## 2023-07-31 NOTE — ED QUICK NOTES
Attempt made to contact Rita Madera, pt's manager at [employer] regarding work comp vs. [employer]. No answer.

## 2023-07-31 NOTE — ED QUICK NOTES
Message left at [employer] regarding drug testing. Per Liz Rey, [employer], pt's place of employment, is listed on our occ health sheet for drug testing.

## 2023-07-31 NOTE — ED INITIAL ASSESSMENT (HPI)
Pt to ED brought by EMS S/P trip and fall while going inside her work place. + right forearm deformity. Pt presents with arm sling.  Employer: [employer]

## 2023-07-31 NOTE — ED QUICK NOTES
+ deformity noted to right forearm. Cms intact. Right arm supported with pillow and ice over gauze placed to arm for comfort. Pt's daughter at bedside.  offered and video  brought to bedside.

## 2023-07-31 NOTE — ED QUICK NOTES
Pt lying on cart. Pt awake and alert,skin w/d,resps appear unlabored. Pt states pain is under control if she does not move. Cms remains intact. Awaitng MD update.

## 2024-01-15 NOTE — PAYOR COMM NOTE
--------------  ADMISSION REVIEW     Payor: [address]  Subscriber #:  [id number]  Authorization Number: [id number]    Admit date: 1/24/18  Admit time: Nicole Foster 45       Admitting Physician: Taylor Matute MD  Attending Physician:  Taylor Matute the patient was also found to have a high D-dimer, and CTA of the chest was positive for PE. The patient is being treated for that as well.     PAST MEDICAL HISTORY:  Pneumonia just diagnosed recently, history is also significant for anemia in the past.  N 1/25/2018  9:15 AM                    MEDICATIONS ADMINISTERED IN LAST 1 DAY:  apixaban (ELIQUIS) tab 10 mg     Date Action Dose Route User    1/24/2018 2131 Given 10 mg Oral Moustapha Landry RN      azithromycin (ZITHROMAX) 500 mg in sodium chloride 0.9 no

## (undated) NOTE — LETTER
Date & Time: 7/31/2023, 11:18 AM  Patient: Nga Pope  Encounter Provider(s):    Dk Ramírez MD       To Whom It May Concern:    Nga Pope was seen and treated in our department on 7/31/2023.  She may not return to work until evaluated by the orthopedist.    If you have any questions or concerns, please do not hesitate to call.        _____________________________  [id number] Signature

## (undated) NOTE — LETTER
Date & Time: 7/31/2023, 10:09 AM  Patient: Dimitri Awan  Encounter Provider(s):    Sande Severe, MD       To Whom It May Concern:    Dimitri Awan was seen and treated in our department on 7/31/2023. She should not return to work until cleared by physician .     If you have any questions or concerns, please do not hesitate to call.        _____________________________  Physician/APC Signature